# Patient Record
Sex: FEMALE | Race: WHITE | NOT HISPANIC OR LATINO | URBAN - METROPOLITAN AREA
[De-identification: names, ages, dates, MRNs, and addresses within clinical notes are randomized per-mention and may not be internally consistent; named-entity substitution may affect disease eponyms.]

---
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
## 2025-02-20 ENCOUNTER — TELEPHONE (OUTPATIENT)
Dept: INTERNAL MEDICINE | Facility: CLINIC | Age: 20
End: 2025-02-20
Payer: COMMERCIAL

## 2025-02-20 NOTE — TELEPHONE ENCOUNTER
----- Message from Med Assistant Nahid sent at 2/20/2025  2:03 PM CST -----  Regarding: VINCENT 3/6/25 @ 1:40 Dr. Kuhn  Please confirm New pt appt. Thank you!

## 2025-03-06 ENCOUNTER — OFFICE VISIT (OUTPATIENT)
Dept: INTERNAL MEDICINE | Facility: CLINIC | Age: 20
End: 2025-03-06
Payer: COMMERCIAL

## 2025-03-06 VITALS
SYSTOLIC BLOOD PRESSURE: 120 MMHG | BODY MASS INDEX: 23.32 KG/M2 | RESPIRATION RATE: 16 BRPM | HEIGHT: 65 IN | DIASTOLIC BLOOD PRESSURE: 82 MMHG | OXYGEN SATURATION: 99 % | HEART RATE: 95 BPM | TEMPERATURE: 98 F | WEIGHT: 140 LBS

## 2025-03-06 DIAGNOSIS — Z00.00 WELLNESS EXAMINATION: Primary | ICD-10-CM

## 2025-03-06 DIAGNOSIS — L70.0 ACNE VULGARIS: ICD-10-CM

## 2025-03-06 DIAGNOSIS — Z23 NEED FOR VACCINATION: ICD-10-CM

## 2025-03-06 DIAGNOSIS — Z13.29 SCREENING FOR HYPOTHYROIDISM: ICD-10-CM

## 2025-03-06 DIAGNOSIS — E55.9 VITAMIN D DEFICIENCY: ICD-10-CM

## 2025-03-06 PROBLEM — M79.672 LEFT FOOT PAIN: Status: RESOLVED | Noted: 2022-09-17 | Resolved: 2025-03-06

## 2025-03-06 LAB
25(OH)D3+25(OH)D2 SERPL-MCNC: 24 NG/ML (ref 30–80)
ALBUMIN SERPL-MCNC: 4.3 G/DL (ref 3.5–5)
ALBUMIN/GLOB SERPL: 1.4 RATIO (ref 1.1–2)
ALP SERPL-CCNC: 70 UNIT/L (ref 40–150)
ALT SERPL-CCNC: 20 UNIT/L (ref 0–55)
ANION GAP SERPL CALC-SCNC: 9 MEQ/L
AST SERPL-CCNC: 21 UNIT/L (ref 5–34)
BACTERIA #/AREA URNS AUTO: ABNORMAL /HPF
BASOPHILS # BLD AUTO: 0.02 X10(3)/MCL
BASOPHILS NFR BLD AUTO: 0.4 %
BILIRUB SERPL-MCNC: 0.6 MG/DL
BILIRUB UR QL STRIP.AUTO: NEGATIVE
BUN SERPL-MCNC: 10.4 MG/DL (ref 7–18.7)
CALCIUM SERPL-MCNC: 9.4 MG/DL (ref 8.4–10.2)
CHLORIDE SERPL-SCNC: 106 MMOL/L (ref 98–107)
CHOLEST SERPL-MCNC: 141 MG/DL
CHOLEST/HDLC SERPL: 3 {RATIO} (ref 0–5)
CLARITY UR: CLEAR
CO2 SERPL-SCNC: 23 MMOL/L (ref 22–29)
COLOR UR AUTO: ABNORMAL
CREAT SERPL-MCNC: 1.06 MG/DL (ref 0.55–1.02)
CREAT/UREA NIT SERPL: 10
EOSINOPHIL # BLD AUTO: 0.04 X10(3)/MCL (ref 0–0.9)
EOSINOPHIL NFR BLD AUTO: 0.8 %
ERYTHROCYTE [DISTWIDTH] IN BLOOD BY AUTOMATED COUNT: 12.2 % (ref 11.5–17)
EST. AVERAGE GLUCOSE BLD GHB EST-MCNC: 93.9 MG/DL
GFR SERPLBLD CREATININE-BSD FMLA CKD-EPI: >60 ML/MIN/1.73/M2
GLOBULIN SER-MCNC: 3 GM/DL (ref 2.4–3.5)
GLUCOSE SERPL-MCNC: 93 MG/DL (ref 74–100)
GLUCOSE UR QL STRIP: NORMAL
HBA1C MFR BLD: 4.9 %
HCT VFR BLD AUTO: 47.8 % (ref 37–47)
HDLC SERPL-MCNC: 43 MG/DL (ref 35–60)
HGB BLD-MCNC: 16 G/DL (ref 12–16)
HGB UR QL STRIP: NEGATIVE
IMM GRANULOCYTES # BLD AUTO: 0.01 X10(3)/MCL (ref 0–0.04)
IMM GRANULOCYTES NFR BLD AUTO: 0.2 %
KETONES UR QL STRIP: ABNORMAL
LDLC SERPL CALC-MCNC: 83 MG/DL (ref 50–140)
LEUKOCYTE ESTERASE UR QL STRIP: NEGATIVE
LYMPHOCYTES # BLD AUTO: 1.65 X10(3)/MCL (ref 0.6–4.6)
LYMPHOCYTES NFR BLD AUTO: 31.2 %
MCH RBC QN AUTO: 29.3 PG (ref 27–31)
MCHC RBC AUTO-ENTMCNC: 33.5 G/DL (ref 33–36)
MCV RBC AUTO: 87.4 FL (ref 80–94)
MONOCYTES # BLD AUTO: 0.44 X10(3)/MCL (ref 0.1–1.3)
MONOCYTES NFR BLD AUTO: 8.3 %
MUCOUS THREADS URNS QL MICRO: ABNORMAL /LPF
NEUTROPHILS # BLD AUTO: 3.13 X10(3)/MCL (ref 2.1–9.2)
NEUTROPHILS NFR BLD AUTO: 59.1 %
NITRITE UR QL STRIP: NEGATIVE
NRBC BLD AUTO-RTO: 0 %
PH UR STRIP: 6 [PH]
PLATELET # BLD AUTO: 269 X10(3)/MCL (ref 130–400)
PMV BLD AUTO: 9.8 FL (ref 7.4–10.4)
POTASSIUM SERPL-SCNC: 3.7 MMOL/L (ref 3.5–5.1)
PROT SERPL-MCNC: 7.3 GM/DL (ref 6.4–8.3)
PROT UR QL STRIP: NEGATIVE
RBC # BLD AUTO: 5.47 X10(6)/MCL (ref 4.2–5.4)
RBC #/AREA URNS AUTO: ABNORMAL /HPF
SODIUM SERPL-SCNC: 138 MMOL/L (ref 136–145)
SP GR UR STRIP.AUTO: 1.03 (ref 1–1.03)
SQUAMOUS #/AREA URNS LPF: ABNORMAL /HPF
TRIGL SERPL-MCNC: 73 MG/DL (ref 37–140)
TSH SERPL-ACNC: 0.66 UIU/ML (ref 0.35–4.94)
UROBILINOGEN UR STRIP-ACNC: NORMAL
VLDLC SERPL CALC-MCNC: 15 MG/DL
WBC # BLD AUTO: 5.29 X10(3)/MCL (ref 4.5–11.5)
WBC #/AREA URNS AUTO: ABNORMAL /HPF

## 2025-03-06 PROCEDURE — 80074 ACUTE HEPATITIS PANEL: CPT | Performed by: INTERNAL MEDICINE

## 2025-03-06 PROCEDURE — 81001 URINALYSIS AUTO W/SCOPE: CPT | Performed by: INTERNAL MEDICINE

## 2025-03-06 PROCEDURE — 82306 VITAMIN D 25 HYDROXY: CPT | Performed by: INTERNAL MEDICINE

## 2025-03-06 PROCEDURE — 80053 COMPREHEN METABOLIC PANEL: CPT | Performed by: INTERNAL MEDICINE

## 2025-03-06 PROCEDURE — 84443 ASSAY THYROID STIM HORMONE: CPT | Performed by: INTERNAL MEDICINE

## 2025-03-06 PROCEDURE — 83036 HEMOGLOBIN GLYCOSYLATED A1C: CPT | Performed by: INTERNAL MEDICINE

## 2025-03-06 PROCEDURE — 87389 HIV-1 AG W/HIV-1&-2 AB AG IA: CPT | Performed by: INTERNAL MEDICINE

## 2025-03-06 PROCEDURE — 80061 LIPID PANEL: CPT | Performed by: INTERNAL MEDICINE

## 2025-03-06 PROCEDURE — 36415 COLL VENOUS BLD VENIPUNCTURE: CPT | Performed by: INTERNAL MEDICINE

## 2025-03-06 PROCEDURE — 85025 COMPLETE CBC W/AUTO DIFF WBC: CPT | Performed by: INTERNAL MEDICINE

## 2025-03-06 NOTE — PROGRESS NOTES
"  Internal Medicine    Patient ID: [id number]     Chief Complaint: Annual Exam      HPI:     Elvira Gandhi is a 19 y.o. female here today for a follow up.     She has a history of skin problems, having previously been treated with Accutane and doxycycline for an extended period. She is currently under the care of Dr. Gould, a dermatologist, but has not been receiving active treatment recently. She expresses a desire to return to the dermatologist due to the current exacerbation.   She denies itching associated with her skin condition, issues with bowel movements, urination, or menstrual cycles.    SOCIAL HISTORY:  Occupation: Student, sophomore in college studying [employer]    Marital status: Single, dating boyfriend for 2 years        History reviewed. No pertinent past medical history.     Past Surgical History:   Procedure Laterality Date    ADENOIDECTOMY      TONSILLECTOMY          Social History     Tobacco Use    Smoking status: Never     Passive exposure: Never    Smokeless tobacco: Never   Substance and Sexual Activity    Alcohol use: Not Currently     Alcohol/week: 1.0 standard drink of alcohol     Types: 1 Drinks containing 0.5 oz of alcohol per week    Drug use: Never    Sexual activity: Yes     Partners: Male     Birth control/protection: Condom        No current outpatient medications    Review of patient's allergies indicates:  No Known Allergies     Patient Care Team:  Ramy Noguera MD as PCP - General     Subjective:     Review of Systems    12 point review of systems conducted, negative except as stated in the history of present illness. See HPI for details.    Objective:     Visit Vitals  /82 (BP Location: Left arm, Patient Position: Sitting)   Pulse 95   Temp 97.7 °F (36.5 °C) (Temporal)   Resp 16   Ht 5' 5" (1.651 m)   Wt 63.5 kg (140 lb)   LMP 02/09/2025   SpO2 99%   BMI 23.30 kg/m²       Physical Exam  Constitutional:       Appearance: Normal appearance.   HENT:      Head: " "Normocephalic and atraumatic.   Eyes:      Extraocular Movements: Extraocular movements intact.      Pupils: Pupils are equal, round, and reactive to light.   Cardiovascular:      Rate and Rhythm: Normal rate and regular rhythm.   Pulmonary:      Effort: Pulmonary effort is normal.      Breath sounds: Normal breath sounds.   Skin:     General: Skin is warm and dry.   Neurological:      General: No focal deficit present.      Mental Status: She is alert.   Psychiatric:         Mood and Affect: Mood normal.         Labs Reviewed:     Chemistry:  No results found for: "NA", "K", "CHLORIDE", "BUN", "CREATININE", "EGFRNORACEVR", "GLUCOSE", "CALCIUM", "ALKPHOS", "LABPROT", "ALBUMIN", "BILIDIR", "IBILI", "AST", "ALT", "MG", "PHOS", "[id number]", "TSH", "[id number]", "PSA"     No results found for: "HGBA1C", "MICROALBCREA"     Hematology:  No results found for: "WBC", "HGB", "HCT", "PLT"    Lipid Panel:  No results found for: "CHOL", "HDL", "LDL", "TRIG", "TOTALCHOLEST"     Urine:  No results found for: "COLORUA", "APPEARANCEUA", "SGUA", "PHUA", "PROTEINUA", "GLUCOSEUA", "KETONESUA", "BLOODUA", "NITRITESUA", "LEUKOCYTESUR", "RBCUA", "WBCUA", "BACTERIA", "SQEPUA", "HYALINECASTS", "CREATRANDUR", "PROTEINURINE", "UPROTCREA"     Assessment and Plan:     Assessment & Plan    E55.9 Vitamin D deficiency  Z23 Need for vaccination  Z00.00 Wellness exam  Z13.29 Screening for hypothyroidism  L70.0 Acne vulgaris    IMPRESSION:   Assessed overall health status of young adult transitioning from pediatric care   Evaluated current skin condition, noting flare-up around chin area   Determined need for flu vaccination given current flu prevalence    Z23 NEED FOR VACCINATION:  - Administered flu vaccine in office.    Z00.00 WELLNESS EXAM:  - Ordered basic labs.  - Referred patient to Dr. Shahbaz Merchant  - Flu vaccine today    L70.0 ACNE VULGARIS:  - Recommend follow-up with dermatologist Dr. Gould for ongoing skin care management.      "     In addition to their scheduled follow up, the patient has also been instructed to follow up on as needed basis.     No future appointments.     Ramy Noguera MD

## 2025-03-07 LAB
HAV IGM SERPL QL IA: NONREACTIVE
HBV CORE IGM SERPL QL IA: NONREACTIVE
HBV SURFACE AG SERPL QL IA: NONREACTIVE
HCV AB SERPL QL IA: NONREACTIVE
HIV 1+2 AB+HIV1 P24 AG SERPL QL IA: NONREACTIVE

## 2025-03-10 ENCOUNTER — RESULTS FOLLOW-UP (OUTPATIENT)
Dept: INTERNAL MEDICINE | Facility: CLINIC | Age: 20
End: 2025-03-10
Payer: COMMERCIAL

## 2025-03-11 NOTE — PROGRESS NOTES
Vit D is a touch low, advised on Vitamin D3 2000 IU daily, available over the counter  Patient needs to increase intake of free water, other wise labs look excellent  Great job

## 2025-06-01 ENCOUNTER — E-VISIT (OUTPATIENT)
Dept: INTERNAL MEDICINE | Facility: CLINIC | Age: 20
End: 2025-06-01
Payer: COMMERCIAL

## 2025-06-01 DIAGNOSIS — L73.9 FOLLICULITIS: Primary | ICD-10-CM

## 2025-06-02 RX ORDER — MUPIROCIN 20 MG/G
OINTMENT TOPICAL 3 TIMES DAILY
Qty: 15 G | Refills: 0 | Status: SHIPPED | OUTPATIENT
Start: 2025-06-02

## 2025-07-29 ENCOUNTER — OFFICE VISIT (OUTPATIENT)
Dept: URGENT CARE | Facility: CLINIC | Age: 20
End: 2025-07-29
Payer: COMMERCIAL

## 2025-07-29 VITALS
WEIGHT: 140 LBS | OXYGEN SATURATION: 99 % | HEIGHT: 65 IN | SYSTOLIC BLOOD PRESSURE: 130 MMHG | TEMPERATURE: 99 F | HEART RATE: 94 BPM | BODY MASS INDEX: 23.32 KG/M2 | RESPIRATION RATE: 17 BRPM | DIASTOLIC BLOOD PRESSURE: 89 MMHG

## 2025-07-29 DIAGNOSIS — R09.81 SINUS CONGESTION: Primary | ICD-10-CM

## 2025-07-29 PROCEDURE — 99213 OFFICE O/P EST LOW 20 MIN: CPT | Mod: 25,,, | Performed by: CLINIC/CENTER

## 2025-07-29 PROCEDURE — 96372 THER/PROPH/DIAG INJ SC/IM: CPT | Mod: ,,, | Performed by: CLINIC/CENTER

## 2025-07-29 RX ORDER — BETAMETHASONE SODIUM PHOSPHATE AND BETAMETHASONE ACETATE 3; 3 MG/ML; MG/ML
9 INJECTION, SUSPENSION INTRA-ARTICULAR; INTRALESIONAL; INTRAMUSCULAR; SOFT TISSUE
Status: COMPLETED | OUTPATIENT
Start: 2025-07-29 | End: 2025-07-29

## 2025-07-29 RX ADMIN — BETAMETHASONE SODIUM PHOSPHATE AND BETAMETHASONE ACETATE 9 MG: 3; 3 INJECTION, SUSPENSION INTRA-ARTICULAR; INTRALESIONAL; INTRAMUSCULAR; SOFT TISSUE at 03:07

## 2025-07-29 NOTE — PROGRESS NOTES
"Subjective:      Patient ID: Elvira Gandhi is a 19 y.o. female.    Vitals:  height is 5' 5" (1.651 m) and weight is 63.5 kg (140 lb). Her oral temperature is 98.6 °F (37 °C). Her blood pressure is 130/89 and her pulse is 94. Her respiration is 17 and oxygen saturation is 99%.     Chief Complaint: Nasal Congestion (Id like a cortisone shot - Entered by patient)    Patient is a 19 y.o. female who presents to urgent care with complaints of nasal congestion x 5 days. Pt requesting steroid shot. Alleviating factors include Claritin, nasal spray with no relief. Patient denies fever, sore throat.        HENT:  Positive for congestion, postnasal drip, sinus pain and sinus pressure.       Objective:     Physical Exam   Constitutional: She is oriented to person, place, and time. She appears well-developed. She is cooperative.  Non-toxic appearance. She does not appear ill. No distress.   HENT:   Head: Normocephalic and atraumatic.   Ears:   Right Ear: Hearing, tympanic membrane, external ear and ear canal normal.   Left Ear: Hearing, tympanic membrane, external ear and ear canal normal.   Nose: Nose normal. No mucosal edema, rhinorrhea or nasal deformity. No epistaxis. Right sinus exhibits no maxillary sinus tenderness and no frontal sinus tenderness. Left sinus exhibits no maxillary sinus tenderness and no frontal sinus tenderness.   Mouth/Throat: Uvula is midline, oropharynx is clear and moist and mucous membranes are normal. No trismus in the jaw. Normal dentition. No uvula swelling. No oropharyngeal exudate, posterior oropharyngeal edema or posterior oropharyngeal erythema.   Eyes: Conjunctivae and lids are normal. No scleral icterus.   Neck: Trachea normal and phonation normal. Neck supple. No edema present. No erythema present. No neck rigidity present.   Cardiovascular: Normal rate, regular rhythm, normal heart sounds and normal pulses.   Pulmonary/Chest: Effort normal and breath sounds normal. No respiratory " "distress. She has no decreased breath sounds. She has no rhonchi.   Abdominal: Normal appearance.   Musculoskeletal: Normal range of motion.         General: No deformity. Normal range of motion.   Neurological: She is alert and oriented to person, place, and time. She exhibits normal muscle tone. Coordination normal.   Skin: Skin is warm, dry, intact, not diaphoretic and not pale.   Psychiatric: Her speech is normal and behavior is normal. Judgment and thought content normal.   Nursing note and vitals reviewed.         Previous History      Review of patient's allergies indicates:  No Known Allergies    Past Medical History:   Diagnosis Date    Known health problems: none      Current Outpatient Medications   Medication Instructions    mupirocin (BACTROBAN) 2 % ointment Topical (Top), 3 times daily     Past Surgical History:   Procedure Laterality Date    ADENOIDECTOMY      TONSILLECTOMY       Family History   Problem Relation Name Age of Onset    Ulcerative colitis Mother      Hyperlipidemia Mother      Lactose intolerance Mother      Colon polyps Father      Colon polyps Paternal Grandmother      Colon polyps Paternal Grandfather         Social History[1]     Physical Exam      Vital Signs Reviewed   /89   Pulse 94   Temp 98.6 °F (37 °C) (Oral)   Resp 17   Ht 5' 5" (1.651 m)   Wt 63.5 kg (140 lb)   LMP 07/08/2025 (Approximate)   SpO2 99%   BMI 23.30 kg/m²        Procedures    Procedures     Labs     Results for orders placed or performed in visit on 03/06/25   HIV 1/2 Ag/Ab (4th Gen)    Collection Time: 03/06/25  1:58 PM   Result Value Ref Range    HIV Nonreactive Nonreactive   Urinalysis, Reflex to Urine Culture    Collection Time: 03/06/25  1:58 PM    Specimen: Urine   Result Value Ref Range    Color, UA Light-Yellow Yellow, Light-Yellow, Colorless, Straw, Dark-Yellow    Appearance, UA Clear Clear    Specific Gravity, UA 1.026 1.005 - 1.030    pH, UA 6.0 5.0 - 8.5    Protein, UA Negative Negative "    Glucose, UA Normal Negative, Normal    Ketones, UA 1+ (A) Negative    Blood, UA Negative Negative    Bilirubin, UA Negative Negative    Urobilinogen, UA Normal 0.2, 1.0, Normal    Nitrites, UA Negative Negative    Leukocyte Esterase, UA Negative Negative    RBC, UA 0-5 None Seen, 0-2, 3-5, 0-5 /HPF    WBC, UA 0-5 None Seen, 0-2, 3-5, 0-5 /HPF    Bacteria, UA None Seen None Seen, Trace /HPF    Squamous Epithelial Cells, UA Trace None Seen, Trace /HPF    Mucous, UA Trace (A) None Seen /LPF   Hemoglobin A1C    Collection Time: 03/06/25  1:58 PM   Result Value Ref Range    Hemoglobin A1c 4.9 <=7.0 %    Estimated Average Glucose 93.9 mg/dL   TSH    Collection Time: 03/06/25  1:58 PM   Result Value Ref Range    TSH 0.656 0.350 - 4.940 uIU/mL   Lipid Panel    Collection Time: 03/06/25  1:58 PM   Result Value Ref Range    Cholesterol Total 141 <=200 mg/dL    HDL Cholesterol 43 35 - 60 mg/dL    Triglyceride 73 37 - 140 mg/dL    Cholesterol/HDL Ratio 3 0 - 5    Very Low Density Lipoprotein 15     LDL Cholesterol 83.00 50.00 - 140.00 mg/dL   Comprehensive Metabolic Panel    Collection Time: 03/06/25  1:58 PM   Result Value Ref Range    Sodium 138 136 - 145 mmol/L    Potassium 3.7 3.5 - 5.1 mmol/L    Chloride 106 98 - 107 mmol/L    CO2 23 22 - 29 mmol/L    Glucose 93 74 - 100 mg/dL    Blood Urea Nitrogen 10.4 7.0 - 18.7 mg/dL    Creatinine 1.06 (H) 0.55 - 1.02 mg/dL    Calcium 9.4 8.4 - 10.2 mg/dL    Protein Total 7.3 6.4 - 8.3 gm/dL    Albumin 4.3 3.5 - 5.0 g/dL    Globulin 3.0 2.4 - 3.5 gm/dL    Albumin/Globulin Ratio 1.4 1.1 - 2.0 ratio    Bilirubin Total 0.6 <=1.5 mg/dL    ALP 70 40 - 150 unit/L    ALT 20 0 - 55 unit/L    AST 21 5 - 34 unit/L    eGFR >60 mL/min/1.73/m2    Anion Gap 9.0 mEq/L    BUN/Creatinine Ratio 10    CBC with Differential    Collection Time: 03/06/25  1:58 PM   Result Value Ref Range    WBC 5.29 4.50 - 11.50 x10(3)/mcL    RBC 5.47 (H) 4.20 - 5.40 x10(6)/mcL    Hgb 16.0 12.0 - 16.0 g/dL    Hct 47.8  (H) 37.0 - 47.0 %    MCV 87.4 80.0 - 94.0 fL    MCH 29.3 27.0 - 31.0 pg    MCHC 33.5 33.0 - 36.0 g/dL    RDW 12.2 11.5 - 17.0 %    Platelet 269 130 - 400 x10(3)/mcL    MPV 9.8 7.4 - 10.4 fL    Neut % 59.1 %    Lymph % 31.2 %    Mono % 8.3 %    Eos % 0.8 %    Basophil % 0.4 %    Imm Grans % 0.2 %    Neut # 3.13 2.1 - 9.2 x10(3)/mcL    Lymph # 1.65 0.6 - 4.6 x10(3)/mcL    Mono # 0.44 0.1 - 1.3 x10(3)/mcL    Eos # 0.04 0 - 0.9 x10(3)/mcL    Baso # 0.02 <=0.2 x10(3)/mcL    Imm Gran # 0.01 0.00 - 0.04 x10(3)/mcL    NRBC% 0.0 %   Vitamin D    Collection Time: 03/06/25  1:58 PM   Result Value Ref Range    Vitamin D 24 (L) 30 - 80 ng/mL   Hepatitis Panel, Acute    Collection Time: 03/06/25  1:58 PM   Result Value Ref Range    Hep A IgM Interp Nonreactive Nonreactive    Hep B Core IgM Interp Nonreactive Nonreactive    Hep BsAg Interp Nonreactive Nonreactive    Hep C Ab Interp Nonreactive Nonreactive   Pathologist Interpretation    Collection Time: 03/06/25  1:58 PM   Result Value Ref Range    Pathology Review       No serological evidence of recent or past hepatitis A, B, or C infection.    Leo Stone M.D.       Assessment:     1. Sinus congestion      Patient's physical exam is completely benign  Plan:   Drink plenty of fluids.     Get plenty of rest.     Tylenol or Motrin as needed.     Go to the ER with any significant change or worsening of symptoms.     Follow up with your primary care doctor.      Sinus congestion  -     betamethasone acetate-betamethasone sodium phosphate injection 9 mg                         [1]   Social History  Tobacco Use    Smoking status: Never     Passive exposure: Never    Smokeless tobacco: Never   Substance Use Topics    Alcohol use: Not Currently     Alcohol/week: 1.0 standard drink of alcohol     Types: 1 Drinks containing 0.5 oz of alcohol per week    Drug use: Never

## 2025-08-06 ENCOUNTER — OFFICE VISIT (OUTPATIENT)
Dept: INTERNAL MEDICINE | Facility: CLINIC | Age: 20
End: 2025-08-06
Payer: COMMERCIAL

## 2025-08-06 VITALS
HEIGHT: 65 IN | HEART RATE: 109 BPM | WEIGHT: 147 LBS | RESPIRATION RATE: 16 BRPM | BODY MASS INDEX: 24.49 KG/M2 | OXYGEN SATURATION: 99 % | SYSTOLIC BLOOD PRESSURE: 112 MMHG | DIASTOLIC BLOOD PRESSURE: 60 MMHG

## 2025-08-06 DIAGNOSIS — R63.5 WEIGHT GAIN: Primary | ICD-10-CM

## 2025-08-06 DIAGNOSIS — E88.819 INSULIN RESISTANCE: ICD-10-CM

## 2025-08-06 PROCEDURE — 99214 OFFICE O/P EST MOD 30 MIN: CPT | Mod: ,,, | Performed by: INTERNAL MEDICINE

## 2025-08-06 PROCEDURE — 3078F DIAST BP <80 MM HG: CPT | Mod: CPTII,,, | Performed by: INTERNAL MEDICINE

## 2025-08-06 PROCEDURE — 3008F BODY MASS INDEX DOCD: CPT | Mod: CPTII,,, | Performed by: INTERNAL MEDICINE

## 2025-08-06 PROCEDURE — 1159F MED LIST DOCD IN RCRD: CPT | Mod: CPTII,,, | Performed by: INTERNAL MEDICINE

## 2025-08-06 PROCEDURE — 3074F SYST BP LT 130 MM HG: CPT | Mod: CPTII,,, | Performed by: INTERNAL MEDICINE

## 2025-08-06 PROCEDURE — 3044F HG A1C LEVEL LT 7.0%: CPT | Mod: CPTII,,, | Performed by: INTERNAL MEDICINE

## 2025-08-06 RX ORDER — METFORMIN HYDROCHLORIDE 500 MG/1
TABLET ORAL
Qty: 60 TABLET | Refills: 3 | Status: SHIPPED | OUTPATIENT
Start: 2025-08-06

## 2025-08-06 NOTE — PROGRESS NOTES
Internal Medicine    Patient ID: [id number]     Chief Complaint: Annual Exam      HPI:     Elvira Gandhi is a 19 y.o. female here today for a follow up.     Patient reports feeling uncomfortable with her size and desires to explore weight management options. She has consistent food cravings and difficulty feeling satiated, continuing to eat when food is available. Patient describes  persistent hunger. Her most comfortable weight was in the 130s  Patient previously had a [employer] membership for exercise but discontinued due to financial constraints. She stays active but finds the constant food cravings discouraging. Patient reports difficulty with nighttime eating, consuming food continuously from arriving home from work until bedtime. She has inconsistent eating patterns, sometimes skipping meals on busy days. Patient attempted using food logging apps in the past but maintained it for only about a 2-week span.        Past Medical History:   Diagnosis Date    Known health problems: none         Past Surgical History:   Procedure Laterality Date    ADENOIDECTOMY      TONSILLECTOMY          Social History     Tobacco Use    Smoking status: Never     Passive exposure: Never    Smokeless tobacco: Never   Substance and Sexual Activity    Alcohol use: Not Currently     Alcohol/week: 1.0 standard drink of alcohol     Types: 1 Drinks containing 0.5 oz of alcohol per week    Drug use: Never    Sexual activity: Yes     Partners: Male     Birth control/protection: Condom        Current Outpatient Medications   Medication Instructions    metFORMIN (GLUCOPHAGE) 500 MG tablet Week 1: one tab in am, if tolerates ok to increase to twice a day after a week       Review of patient's allergies indicates:  No Known Allergies     Patient Care Team:  Ramy Noguera MD as PCP - General     Subjective:     Review of Systems    12 point review of systems conducted, negative except as stated in the history of present illness.  "See HPI for details.    Objective:     Visit Vitals  /60 (BP Location: Right arm, Patient Position: Sitting)   Pulse 109   Resp 16   Ht 5' 5" (1.651 m)   Wt 66.7 kg (147 lb)   LMP 07/08/2025 (Approximate)   SpO2 99%   BMI 24.46 kg/m²       Physical Exam  Constitutional:       Appearance: Normal appearance.   Pulmonary:      Effort: Pulmonary effort is normal.   Neurological:      Mental Status: She is alert.   Psychiatric:         Mood and Affect: Mood normal.         Behavior: Behavior normal.           Labs Reviewed:     Chemistry:  Lab Results   Component Value Date     03/06/2025    K 3.7 03/06/2025    BUN 10.4 03/06/2025    CREATININE 1.06 (H) 03/06/2025    EGFRNORACEVR >60 03/06/2025    CALCIUM 9.4 03/06/2025    ALKPHOS 70 03/06/2025    ALBUMIN 4.3 03/06/2025    AST 21 03/06/2025    ALT 20 03/06/2025    MJGPJFQZ18LR 24 (L) 03/06/2025    TSH 0.656 03/06/2025        Lab Results   Component Value Date    HGBA1C 4.9 03/06/2025        Hematology:  Lab Results   Component Value Date    WBC 5.29 03/06/2025    HGB 16.0 03/06/2025    HCT 47.8 (H) 03/06/2025     03/06/2025       Lipid Panel:  Lab Results   Component Value Date    CHOL 141 03/06/2025    HDL 43 03/06/2025    TRIG 73 03/06/2025    TOTALCHOLEST 3 03/06/2025        Urine:  Lab Results   Component Value Date    APPEARANCEUA Clear 03/06/2025    SGUA 1.026 03/06/2025    PROTEINUA Negative 03/06/2025    KETONESUA 1+ (A) 03/06/2025    LEUKOCYTESUR Negative 03/06/2025    RBCUA 0-5 03/06/2025    WBCUA 0-5 03/06/2025    BACTERIA None Seen 03/06/2025    SQEPUA Trace 03/06/2025        Assessment and Plan:     Assessment & Plan    E88.819 Insulin resistance  R63.5 Weight gain      IMPRESSION:   Patient does not meet BMI criteria (24.46) for FDA-approved GLP-1 agonist use.   Ruled out Topamax for appetite suppression due to potential for pregnancy and associated birth defects.   Evaluated Adipex (phentermine) as an option, but patient expressed " "uncertainty about stimulants.   Started Metformin as an appropriate intervention for potential insulin resistance and appetite control, initially daily in the morning.    E88.819 INSULIN RESISTANCE:  - Initiated Metformin daily in the morning for potential insulin resistance and appetite control.  - Advised to break tablet in half if needed for easier swallowing.  - May increase to twice daily after 1 week if tolerated without adverse effects.  - Counseled on potential GI side effects, particularly with high-fat or high-carbohydrate meals.    R63.5 WEIGHT GAIN:  - Explained the benefits of food logging to increase awareness of eating habits and calorie intake for weight management.  - Advised maintaining a balanced diet with emphasis on fruits, vegetables, whole grains, fish, and lean chicken.  - Explained the relationship between skipping breakfast and increased nighttime eating.  - Patient reports feeling uncomfortable with size and has "food noise" all the time, leading to overeating.  - Recommend establishing a consistent breakfast routine with adequate protein and fiber to manage nighttime eating.           Follow up in about 3 months (around 11/6/2025) for [facility] ([facility] In addition to their scheduled follow up, the patient has also been instructed to follow up on as needed basis.     Future Appointments   Date Time Provider Department Center   11/26/2025  2:20 PM Ramy Noguera MD [facility] [address]   3/9/2026  1:20 PM Ramy Noguera MD [facility] [id number]        Ramy Noguera MD   This note was generated with the assistance of ambient listening technology. Verbal consent was obtained by the patient and accompanying visitor(s) for the recording of patient appointment to facilitate this note. I attest to having reviewed and edited the generated note for accuracy, though some syntax or spelling errors may persist. Please contact the author of this note " for any clarification.